# Patient Record
(demographics unavailable — no encounter records)

---

## 2025-06-23 NOTE — ASSESSMENT
[Patient Optimized for Surgery] : Patient optimized for surgery [No Further Testing Recommended] : no further testing recommended [FreeTextEntry4] : : [id number] 51 yrs old M with pmx of HTN, HLD, b/l inguinal hernia s/p surgery comes in for pre-op eval for liposuction and fat transfer to buttocks.  RCRI 0 points class I risk. Moderate functional status Pt is low risk for low/intermediate risk procedure. Pt with high BP, started on losartan 25 mg daily, advised BP check at home and to follow with his PCP.  No interventions needed from medicine at this point of time.

## 2025-06-23 NOTE — ASSESSMENT
[Patient Optimized for Surgery] : Patient optimized for surgery [No Further Testing Recommended] : no further testing recommended [FreeTextEntry4] : : [id number] 51 yrs old M with pmx of HTN, HLD, b/l inguinal hernia s/p surgery comes in for pre-op eval for liposuction and fat transfer to buttocks.  RCRI 0 points class I risk. Moderate functional status Pt is low risk for low/intermediate risk procedure. Pt with high BP, started on losartan 25 mg daily, advised BP check at home and to follow with his PCP.  No interventions needed from medicine at this point of time.

## 2025-06-23 NOTE — HISTORY OF PRESENT ILLNESS
[(Patient denies any chest pain, claudication, dyspnea on exertion, orthopnea, palpitations or syncope)] : Patient denies any chest pain, claudication, dyspnea on exertion, orthopnea, palpitations or syncope [Moderate (4-6 METs)] : Moderate (4-6 METs) [No Pertinent Cardiac History] : no history of aortic stenosis, atrial fibrillation, coronary artery disease, recent myocardial infarction, or implantable device/pacemaker [No Pertinent Pulmonary History] : no history of asthma, COPD, sleep apnea, or smoking [No Adverse Anesthesia Reaction] : no adverse anesthesia reaction in self or family member [Chronic Anticoagulation] : no chronic anticoagulation [Chronic Kidney Disease] : no chronic kidney disease [Diabetes] : no diabetes [FreeTextEntry1] : liposuction and fat transfer to buttocks.  [FreeTextEntry4] : : [id number] 51 yrs old M with pmx of HTN, HLD, b/l inguinal hernia s/p surgery comes in for pre-op eval for liposuction and fat transfer to buttocks.  No new complains.  Denies any chest pain, cough, fevers, abd pain, vomiting, diarrhea, rash, joint pains, sob, palpitations. Moderate functional capacity: can walk more than 20 blocks without any problems.  No problems with anesthesia in the past. Denies any blood thinners or herbal meds use.

## 2025-06-23 NOTE — HISTORY OF PRESENT ILLNESS
[(Patient denies any chest pain, claudication, dyspnea on exertion, orthopnea, palpitations or syncope)] : Patient denies any chest pain, claudication, dyspnea on exertion, orthopnea, palpitations or syncope [Moderate (4-6 METs)] : Moderate (4-6 METs) [No Pertinent Cardiac History] : no history of aortic stenosis, atrial fibrillation, coronary artery disease, recent myocardial infarction, or implantable device/pacemaker [No Pertinent Pulmonary History] : no history of asthma, COPD, sleep apnea, or smoking [No Adverse Anesthesia Reaction] : no adverse anesthesia reaction in self or family member [Chronic Anticoagulation] : no chronic anticoagulation [Chronic Kidney Disease] : no chronic kidney disease [Diabetes] : no diabetes [FreeTextEntry1] : liposuction and fat transfer to buttocks.  [FreeTextEntry4] : : [id number] 51 yrs old M with pmx of HTN, HLD, b/l inguinal hernia s/p surgery comes in for pre-op eval for liposuction and fat transfer to buttocks.  No new complains.  Denies any chest pain, cough, fevers, abd pain, vomiting, diarrhea, rash, joint pains, sob, palpitations. Moderate functional capacity: can walk more than 20 blocks without any problems.  No problems with anesthesia in the past. Denies any blood thinners or herbal meds use.